# Patient Record
Sex: FEMALE
[De-identification: names, ages, dates, MRNs, and addresses within clinical notes are randomized per-mention and may not be internally consistent; named-entity substitution may affect disease eponyms.]

---

## 2019-03-25 ENCOUNTER — RESULT REVIEW (OUTPATIENT)
Age: 34
End: 2019-03-25

## 2021-04-30 ENCOUNTER — EMERGENCY (EMERGENCY)
Facility: HOSPITAL | Age: 36
LOS: 1 days | Discharge: HOME | End: 2021-04-30
Attending: EMERGENCY MEDICINE
Payer: COMMERCIAL

## 2021-04-30 VITALS
SYSTOLIC BLOOD PRESSURE: 135 MMHG | WEIGHT: 199.96 LBS | RESPIRATION RATE: 18 BRPM | TEMPERATURE: 98 F | HEART RATE: 120 BPM | HEIGHT: 66 IN | OXYGEN SATURATION: 99 % | DIASTOLIC BLOOD PRESSURE: 86 MMHG

## 2021-04-30 LAB
ALBUMIN SERPL ELPH-MCNC: 4.4 G/DL — SIGNIFICANT CHANGE UP (ref 3.3–5)
ALP SERPL-CCNC: 99 U/L — SIGNIFICANT CHANGE UP (ref 40–120)
ALT FLD-CCNC: 31 U/L — SIGNIFICANT CHANGE UP (ref 10–45)
ANION GAP SERPL CALC-SCNC: 16 MMOL/L — SIGNIFICANT CHANGE UP (ref 5–17)
AST SERPL-CCNC: 29 U/L — SIGNIFICANT CHANGE UP (ref 10–40)
BILIRUB SERPL-MCNC: 0.4 MG/DL — SIGNIFICANT CHANGE UP (ref 0.2–1.2)
BUN SERPL-MCNC: 12 MG/DL — SIGNIFICANT CHANGE UP (ref 7–23)
CALCIUM SERPL-MCNC: 9.7 MG/DL — SIGNIFICANT CHANGE UP (ref 8.4–10.5)
CHLORIDE SERPL-SCNC: 101 MMOL/L — SIGNIFICANT CHANGE UP (ref 96–108)
CO2 SERPL-SCNC: 23 MMOL/L — SIGNIFICANT CHANGE UP (ref 22–31)
CREAT SERPL-MCNC: 0.71 MG/DL — SIGNIFICANT CHANGE UP (ref 0.5–1.3)
D DIMER BLD IA.RAPID-MCNC: <150 NG/ML DDU — SIGNIFICANT CHANGE UP
GLUCOSE SERPL-MCNC: 96 MG/DL — SIGNIFICANT CHANGE UP (ref 70–99)
HCT VFR BLD CALC: 41.7 % — SIGNIFICANT CHANGE UP (ref 34.5–45)
HGB BLD-MCNC: 13.9 G/DL — SIGNIFICANT CHANGE UP (ref 11.5–15.5)
MAGNESIUM SERPL-MCNC: 1.9 MG/DL — SIGNIFICANT CHANGE UP (ref 1.6–2.6)
MCHC RBC-ENTMCNC: 28.7 PG — SIGNIFICANT CHANGE UP (ref 27–34)
MCHC RBC-ENTMCNC: 33.3 GM/DL — SIGNIFICANT CHANGE UP (ref 32–36)
MCV RBC AUTO: 86.2 FL — SIGNIFICANT CHANGE UP (ref 80–100)
NRBC # BLD: 0 /100 WBCS — SIGNIFICANT CHANGE UP (ref 0–0)
PHOSPHATE SERPL-MCNC: 1.8 MG/DL — LOW (ref 2.5–4.5)
PLATELET # BLD AUTO: 345 K/UL — SIGNIFICANT CHANGE UP (ref 150–400)
POTASSIUM SERPL-MCNC: 3.9 MMOL/L — SIGNIFICANT CHANGE UP (ref 3.5–5.3)
POTASSIUM SERPL-SCNC: 3.9 MMOL/L — SIGNIFICANT CHANGE UP (ref 3.5–5.3)
PROT SERPL-MCNC: 7.7 G/DL — SIGNIFICANT CHANGE UP (ref 6–8.3)
RAPID RVP RESULT: SIGNIFICANT CHANGE UP
RBC # BLD: 4.84 M/UL — SIGNIFICANT CHANGE UP (ref 3.8–5.2)
RBC # FLD: 12.9 % — SIGNIFICANT CHANGE UP (ref 10.3–14.5)
SARS-COV-2 RNA SPEC QL NAA+PROBE: SIGNIFICANT CHANGE UP
SODIUM SERPL-SCNC: 140 MMOL/L — SIGNIFICANT CHANGE UP (ref 135–145)
WBC # BLD: 13.79 K/UL — HIGH (ref 3.8–10.5)
WBC # FLD AUTO: 13.79 K/UL — HIGH (ref 3.8–10.5)

## 2021-04-30 PROCEDURE — 93010 ELECTROCARDIOGRAM REPORT: CPT

## 2021-04-30 PROCEDURE — 99220: CPT

## 2021-04-30 PROCEDURE — 71045 X-RAY EXAM CHEST 1 VIEW: CPT | Mod: 26

## 2021-04-30 RX ORDER — SODIUM,POTASSIUM PHOSPHATES 278-250MG
1 POWDER IN PACKET (EA) ORAL ONCE
Refills: 0 | Status: COMPLETED | OUTPATIENT
Start: 2021-04-30 | End: 2021-04-30

## 2021-04-30 RX ORDER — POTASSIUM PHOSPHATE, MONOBASIC POTASSIUM PHOSPHATE, DIBASIC 236; 224 MG/ML; MG/ML
15 INJECTION, SOLUTION INTRAVENOUS ONCE
Refills: 0 | Status: COMPLETED | OUTPATIENT
Start: 2021-04-30 | End: 2021-04-30

## 2021-04-30 RX ORDER — SODIUM CHLORIDE 9 MG/ML
1000 INJECTION INTRAMUSCULAR; INTRAVENOUS; SUBCUTANEOUS ONCE
Refills: 0 | Status: COMPLETED | OUTPATIENT
Start: 2021-04-30 | End: 2021-04-30

## 2021-04-30 RX ORDER — POTASSIUM CHLORIDE 20 MEQ
10 PACKET (EA) ORAL
Refills: 0 | Status: DISCONTINUED | OUTPATIENT
Start: 2021-04-30 | End: 2021-04-30

## 2021-04-30 RX ADMIN — Medication 1 PACKET(S): at 19:35

## 2021-04-30 RX ADMIN — SODIUM CHLORIDE 1000 MILLILITER(S): 9 INJECTION INTRAMUSCULAR; INTRAVENOUS; SUBCUTANEOUS at 20:45

## 2021-04-30 RX ADMIN — POTASSIUM PHOSPHATE, MONOBASIC POTASSIUM PHOSPHATE, DIBASIC 62.5 MILLIMOLE(S): 236; 224 INJECTION, SOLUTION INTRAVENOUS at 19:35

## 2021-04-30 NOTE — ED PROVIDER NOTE - PHYSICAL EXAMINATION
Attn - alert, NAD, tachycardia on cardiac monitor 120-130s.  no pallor or jaundice, PERRL 3 mm, moist mm, skin - warm and dry, Lungs - clear, no w/r/r, good BS bilaterally, Cor - rr, no M, no rub, Abdo - ND, soft, NT, no HSM, no CVAT, no guarding or rebound. Extremities - no edema, no calf tenderness, distal pulses intact and symmetrical, Neuro - intact and non-focal

## 2021-04-30 NOTE — ED ADULT TRIAGE NOTE - CHIEF COMPLAINT QUOTE
pain upon inspiration x the passed 3 days, sent by her pcp for cta and to r/o PE, denies any cardiac hx or hx of blood clots in the passed- not currently on any medications

## 2021-04-30 NOTE — ED ADULT NURSE REASSESSMENT NOTE - NS ED NURSE REASSESS COMMENT FT1
Pt resting in stretcher, a&ox3, nad, no increased wob noted, speaking full sentences, skin warm and appropriate color, able to follow commands. Pt denies CP, SOB, palpitations, numbness/tingling, extremity swelling, loss of sensation, h/a, dizziness, lightheadedness, weakness. Pt NSR on cardiac monitor. Pending CDU

## 2021-04-30 NOTE — ED CDU PROVIDER INITIAL DAY NOTE - OBJECTIVE STATEMENT
Attn - pt seen in Rm19 - pt aware of breathing and tachycardia since Tues PM - pt reports exposure to dog Tuesday day and possible allergic rxn.  no chest pain.  increased awareness of breathing with ambulation, but pt was able to walk into ER without caraballo.  no chest pain, palp.  feeling better when sitting up. pt has chronic leg pain. no recent travel, no family hx of PE/DVT.  pt call PMD today and advised to go to ER.  no fever. no COVID in the last yr and no vaccine.    PMD: Dr. Hayden Urena 34 yo female with no pmh here for awareness of breathing and tachycardia since Tues PM - pt reports exposure to dog Tuesday day and possible allergic rxn.  no chest pain.  increased awareness of breathing with ambulation, but pt was able to walk into ER without caraballo.  no chest pain, palp.  feeling better when sitting up. pt has chronic leg pain. no recent travel, no family hx of PE/DVT.  pt call PMD today and advised to go to ER.  no fever. no COVID in the last yr and no vaccine.     In ED EKG shows sinus tach, labs unremarkable other than low phosphorus which was repleted, CXR clear, D-dimer engative. pt still tachycardic in low 100s, given 1L NS and sent to CDU for echocardiogram.          PMD: Dr. Hayden Urena

## 2021-04-30 NOTE — ED ADULT NURSE NOTE - NS_ED_NURSE_TEACHING_TOPIC_ED_A_ED
chest pain, shortness of breath, palpitation's, signs & symptoms of an allergic reaction-->return to ER

## 2021-04-30 NOTE — ED CDU PROVIDER DISPOSITION NOTE - PATIENT PORTAL LINK FT
You can access the FollowMyHealth Patient Portal offered by [facility] by registering at the following website: [email or web address]. By joining [employer]’s FollowMyHealth portal, you will also be able to view your health information using other applications (apps) compatible with our system.

## 2021-04-30 NOTE — ED ADULT NURSE NOTE - OBJECTIVE STATEMENT
35 yr old with no PMH coming in with SOB upon inspiration for the last 2 days. Pt states that she has been having trouble taking a deep breath. Pt went to her PCP this AM and was sent here to the ED to r/o a PE and get a CT scan. Pt denies any fever, chills, n/v/d, or CP at current time. Pt is A&Ox3. VSS; pt slightly tachycardic. Pts lung sounds clear b/l. Abdomen soft to touch with normoactive bowel sounds. Skin intact and normal for race. +2 palpable peripheral pulses. Cap refill <2 seconds. Pt placed on tele. Call bell at bedside. Will continue to monitor

## 2021-04-30 NOTE — ED CDU PROVIDER DISPOSITION NOTE - ATTENDING CONTRIBUTION TO CARE
Attending MD Alcantara.  Pt seen by me on the morning of 5/1/2021 in CDU/obs unit. Pt is a 36 yo female with no sig pmhx who was around a dog on Tuesday and thought she may be having an allergic reaction, since that time has felt she couldn't take a deep breath and like her heart was racing.  Pt's labs/dimer non-actionable.  Pt sent to CDU/obs unit for an echo.  Pt's sxs have resolved in CDU overnight.  Echo performed this morning.  Pt pending echo results at time of eval this morning in hemodynamically stable condition overnight.

## 2021-04-30 NOTE — ED CDU PROVIDER DISPOSITION NOTE - CLINICAL COURSE
36 yo female with no pmh here for awareness of breathing and tachycardia since Tues PM - pt reports exposure to dog Tuesday day and possible allergic rxn.  no chest pain.  increased awareness of breathing with ambulation, but pt was able to walk into ER without caraballo.  no chest pain, palp.  feeling better when sitting up. pt has chronic leg pain. no recent travel, no family hx of PE/DVT.  pt call PMD today and advised to go to ER.  no fever. no COVID in the last yr and no vaccine.     In ED EKG shows sinus tach, labs unremarkable other than low phosphorus which was repleted, CXR clear, D-dimer engative. pt still tachycardic in low 100s, given 1L NS and sent to CDU for echocardiogram.      In CDU__. 34 yo female with no pmh here for awareness of breathing and tachycardia since Tues PM - pt reports exposure to dog Tuesday day and possible allergic rxn.  no chest pain.  increased awareness of breathing with ambulation, but pt was able to walk into ER without caraballo.  no chest pain, palp.  feeling better when sitting up. pt has chronic leg pain. no recent travel, no family hx of PE/DVT.  pt call PMD today and advised to go to ER.  no fever. no COVID in the last yr and no vaccine.     In ED EKG shows sinus tach, labs unremarkable other than low phosphorus which was repleted, CXR clear, D-dimer engative. pt still tachycardic in low 100s, given 1L NS and sent to CDU for echocardiogram.      In CDU Pt TTE resulted as follows "1. Normal left ventricular internal dimensions and wall thicknesses. 2. Normal left ventricular systolic function. No segmental wall motion abnormalities. 3. Normal diastolic function 4. Normal right ventricular size and function". Pt has felt improved symptoms while in CDU and has been NSR. Advisded TSH 4.37 and need to f/up PCP. Will also provide allergy follow up given ?new allergy to dogs. Pt seen and evaluated by Dr. Alcantara who agrees w/ plan to d/c home

## 2021-04-30 NOTE — ED PROVIDER NOTE - CLINICAL SUMMARY MEDICAL DECISION MAKING FREE TEXT BOX
Attn - pt with 3 days of tachycardia, and awareness of breathing and sent to ER to r/o PE.  very low suspicion.  D-Dimer, EKG, CXR, reassess.  if persistent tachycardia without clear etiology, would consider CDU and ECHO.

## 2021-04-30 NOTE — ED PROVIDER NOTE - OBJECTIVE STATEMENT
Attn - pt seen in Rm19 - pt aware of breathing and tachycardia since Tues PM - pt reports exposure to dog Tuesday day and possible allergic rxn.  no chest pain.  increased awareness of breathing with ambulation, but pt was able to walk into ER without caraballo.  no chest pain, palp.  feeling better when sitting up. pt has chronic leg pain. no recent travel, no family hx of PE/DVT.  pt call PMD today and advised to go to ER.  no fever. no COVID in the last yr and no vaccine.

## 2021-04-30 NOTE — ED CDU PROVIDER DISPOSITION NOTE - NSFOLLOWUPINSTRUCTIONS_ED_ALL_ED_FT
- stay hydrated.   - follow up with your pcp in 1-2 days.  - follow up with cardiology Dr. _____________. within one week.   - return if symptoms worsen, have shortness of breath, difficulty breathing, chest pain and all other concerns. 1. Please rest and stay hydrated   2. Please follow up with your Primary Care Doctor in 2-3 days for reevaluation. Please bring copy of results to follow up and discuss further Thyroid testing   3. We additionally would like you to follow up with an allergist after discharge. Please see information below to make an appointment       [facility]      [address] [place] [address]      Phone: [phone]  4. Return to ED for change of symptoms including worsening or continued palpitation, shortness of breath, difficulty breathing, chest pain, nausea/vomiting/diarrhea and all other concerns.

## 2021-05-01 VITALS
RESPIRATION RATE: 18 BRPM | OXYGEN SATURATION: 98 % | SYSTOLIC BLOOD PRESSURE: 111 MMHG | TEMPERATURE: 98 F | DIASTOLIC BLOOD PRESSURE: 79 MMHG | HEART RATE: 80 BPM

## 2021-05-01 LAB
ALBUMIN SERPL ELPH-MCNC: 4 G/DL — SIGNIFICANT CHANGE UP (ref 3.3–5)
ALP SERPL-CCNC: 94 U/L — SIGNIFICANT CHANGE UP (ref 40–120)
ALT FLD-CCNC: 34 U/L — SIGNIFICANT CHANGE UP (ref 10–45)
ANION GAP SERPL CALC-SCNC: 14 MMOL/L — SIGNIFICANT CHANGE UP (ref 5–17)
AST SERPL-CCNC: 30 U/L — SIGNIFICANT CHANGE UP (ref 10–40)
BILIRUB SERPL-MCNC: 0.6 MG/DL — SIGNIFICANT CHANGE UP (ref 0.2–1.2)
BUN SERPL-MCNC: 11 MG/DL — SIGNIFICANT CHANGE UP (ref 7–23)
CALCIUM SERPL-MCNC: 9.3 MG/DL — SIGNIFICANT CHANGE UP (ref 8.4–10.5)
CHLORIDE SERPL-SCNC: 104 MMOL/L — SIGNIFICANT CHANGE UP (ref 96–108)
CO2 SERPL-SCNC: 21 MMOL/L — LOW (ref 22–31)
CREAT SERPL-MCNC: 0.66 MG/DL — SIGNIFICANT CHANGE UP (ref 0.5–1.3)
GLUCOSE SERPL-MCNC: 96 MG/DL — SIGNIFICANT CHANGE UP (ref 70–99)
PHOSPHATE SERPL-MCNC: 3.2 MG/DL — SIGNIFICANT CHANGE UP (ref 2.5–4.5)
POTASSIUM SERPL-MCNC: 3.7 MMOL/L — SIGNIFICANT CHANGE UP (ref 3.5–5.3)
POTASSIUM SERPL-SCNC: 3.7 MMOL/L — SIGNIFICANT CHANGE UP (ref 3.5–5.3)
PROT SERPL-MCNC: 7.1 G/DL — SIGNIFICANT CHANGE UP (ref 6–8.3)
SODIUM SERPL-SCNC: 139 MMOL/L — SIGNIFICANT CHANGE UP (ref 135–145)
TSH SERPL-MCNC: 4.37 UIU/ML — HIGH (ref 0.27–4.2)

## 2021-05-01 PROCEDURE — 85379 FIBRIN DEGRADATION QUANT: CPT

## 2021-05-01 PROCEDURE — 96374 THER/PROPH/DIAG INJ IV PUSH: CPT | Mod: XU

## 2021-05-01 PROCEDURE — G0378: CPT

## 2021-05-01 PROCEDURE — 93306 TTE W/DOPPLER COMPLETE: CPT | Mod: 26

## 2021-05-01 PROCEDURE — 99217: CPT

## 2021-05-01 PROCEDURE — 0225U NFCT DS DNA&RNA 21 SARSCOV2: CPT

## 2021-05-01 PROCEDURE — 85027 COMPLETE CBC AUTOMATED: CPT

## 2021-05-01 PROCEDURE — 76376 3D RENDER W/INTRP POSTPROCES: CPT

## 2021-05-01 PROCEDURE — 84702 CHORIONIC GONADOTROPIN TEST: CPT

## 2021-05-01 PROCEDURE — 84443 ASSAY THYROID STIM HORMONE: CPT

## 2021-05-01 PROCEDURE — 93306 TTE W/DOPPLER COMPLETE: CPT

## 2021-05-01 PROCEDURE — 80053 COMPREHEN METABOLIC PANEL: CPT

## 2021-05-01 PROCEDURE — 93005 ELECTROCARDIOGRAM TRACING: CPT

## 2021-05-01 PROCEDURE — 99283 EMERGENCY DEPT VISIT LOW MDM: CPT | Mod: 25

## 2021-05-01 PROCEDURE — 76376 3D RENDER W/INTRP POSTPROCES: CPT | Mod: 26

## 2021-05-01 PROCEDURE — 71045 X-RAY EXAM CHEST 1 VIEW: CPT

## 2021-05-01 PROCEDURE — 84100 ASSAY OF PHOSPHORUS: CPT

## 2021-05-01 PROCEDURE — 83735 ASSAY OF MAGNESIUM: CPT

## 2021-05-01 NOTE — ED CDU PROVIDER SUBSEQUENT DAY NOTE - PROGRESS NOTE DETAILS
Pt resting comfortably. NAD. No complaints. VSS. no events on tele, NSR in the 60s-70s, no cp, sob, heart palpitations will continue to monitor. CDU NOTE MIRIAM Frazier: VSS NAD. Patient is resting comfortably and is without any complaints. NSR on tele, feels improved. Pending TTE results CDU NOTE MIRIAM Frazier: VSS NAD. Patient is resting comfortably and is without any complaints. TSH 4.37, will advise PCP f/up at d/c. Pending results of TTE Pt TTE resulted as follows "1. Normal left ventricular internal dimensions and wall thicknesses. 2. Normal left ventricular systolic function. No segmental wall motion abnormalities. 3. Normal diastolic function 4. Normal right ventricular size and function". Pt has felt improved symptoms while in CDU and has been NSR. Advisded TSH 4.37 and need to f/up PCP. Will also provide allergy follow up given ?new allergy to dogs. Pt seen and evaluated by Dr. Alcantara who agrees w/ plan to d/c home   Stefany Frazier PA-C

## 2021-05-01 NOTE — ED CDU PROVIDER SUBSEQUENT DAY NOTE - MEDICAL DECISION MAKING DETAILS
Attending MD Alcantara.  Pt seen by me on the morning of 5/1/2021 in CDU/obs unit. Pt is a 34 yo female with no sig pmhx who was around a dog on Tuesday and thought she may be having an allergic reaction, since that time has felt she couldn't take a deep breath and like her heart was racing.  Pt's labs/dimer non-actionable.  Pt sent to CDU/obs unit for an echo.  Pt's sxs have resolved in CDU overnight.  Echo performed this morning.  Pt pending echo results at time of eval this morning in hemodynamically stable condition overnight.

## 2021-05-01 NOTE — ED CDU PROVIDER SUBSEQUENT DAY NOTE - HISTORY
Pt resting comfortably. NAD. No complaints. VSS. no events on tele, HR 70s, no chest pain sob, difficulty breathing, will cont to monitor. -Betty Kelley PA-C

## 2021-05-05 DIAGNOSIS — Z20.822 ENCOUNTER FOR PREPROCEDURAL LABORATORY EXAMINATION: ICD-10-CM

## 2021-05-05 DIAGNOSIS — Z01.812 ENCOUNTER FOR PREPROCEDURAL LABORATORY EXAMINATION: ICD-10-CM

## 2021-05-05 PROBLEM — Z00.00 ENCOUNTER FOR PREVENTIVE HEALTH EXAMINATION: Status: ACTIVE | Noted: 2021-05-05

## 2021-05-13 ENCOUNTER — APPOINTMENT (OUTPATIENT)
Dept: PULMONOLOGY | Facility: CLINIC | Age: 36
End: 2021-05-13

## 2021-06-15 ENCOUNTER — APPOINTMENT (OUTPATIENT)
Dept: OBGYN | Facility: CLINIC | Age: 36
End: 2021-06-15

## 2021-12-20 ENCOUNTER — APPOINTMENT (OUTPATIENT)
Dept: ENDOCRINOLOGY | Facility: CLINIC | Age: 36
End: 2021-12-20

## 2023-10-17 NOTE — ED CDU PROVIDER SUBSEQUENT DAY NOTE - DATE/TIME 2
Problem: Discharge Planning  Goal: Discharge to home or other facility with appropriate resources  10/17/2023 1300 by Shreya Anthony RN  Outcome: Progressing  10/17/2023 0201 by Fermin Castro RN  Outcome: Progressing     Problem: Safety - Adult  Goal: Free from fall injury  10/17/2023 1300 by Shreya Anthony RN  Outcome: Progressing  10/17/2023 0201 by Fermin Castro RN  Outcome: Progressing     Problem: ABCDS Injury Assessment  Goal: Absence of physical injury  10/17/2023 1300 by Shreya Anthony RN  Outcome: Progressing  10/17/2023 0201 by Fermin Castro RN  Outcome: Progressing     Problem: Pain  Goal: Verbalizes/displays adequate comfort level or baseline comfort level  10/17/2023 1300 by Shreya Anthony RN  Outcome: Progressing  10/17/2023 0201 by Fermin Castro RN  Outcome: Progressing     Problem: Cardiovascular - Adult  Goal: Absence of cardiac dysrhythmias or at baseline  10/17/2023 0201 by Fermin Castro RN  Outcome: Progressing     Problem: Gastrointestinal - Adult  Goal: Minimal or absence of nausea and vomiting  10/17/2023 1300 by Shreya Anthony RN  Outcome: Progressing  10/17/2023 0201 by Fermin Castro RN  Outcome: Progressing 01-May-2021 08:30